# Patient Record
Sex: FEMALE | Race: WHITE | NOT HISPANIC OR LATINO | Employment: FULL TIME | URBAN - METROPOLITAN AREA
[De-identification: names, ages, dates, MRNs, and addresses within clinical notes are randomized per-mention and may not be internally consistent; named-entity substitution may affect disease eponyms.]

---

## 2023-11-11 PROBLEM — K80.10 CALCULUS OF GALLBLADDER WITH CHRONIC CHOLECYSTITIS WITHOUT OBSTRUCTION: Status: ACTIVE | Noted: 2023-11-11

## 2023-11-11 PROBLEM — J44.9 CHRONIC OBSTRUCTIVE LUNG DISEASE (MULTI): Status: ACTIVE | Noted: 2023-11-11

## 2023-11-11 PROBLEM — M35.00 SJOGREN'S SYNDROME (MULTI): Status: ACTIVE | Noted: 2023-11-11

## 2023-11-11 PROBLEM — M06.9 ARTHRITIS, RHEUMATOID (MULTI): Status: ACTIVE | Noted: 2023-11-11

## 2023-11-11 PROBLEM — K21.9 GASTROESOPHAGEAL REFLUX DISEASE: Status: ACTIVE | Noted: 2023-11-11

## 2023-11-11 PROBLEM — M18.12 PRIMARY OSTEOARTHRITIS OF FIRST CARPOMETACARPAL JOINT OF LEFT HAND: Status: ACTIVE | Noted: 2023-11-11

## 2023-11-11 PROBLEM — R07.9 CHEST PAIN: Status: ACTIVE | Noted: 2023-11-11

## 2023-11-11 PROBLEM — R06.00 DYSPNEA: Status: ACTIVE | Noted: 2023-11-11

## 2023-11-11 RX ORDER — HYDROCORTISONE 1 %
CREAM (GRAM) TOPICAL
COMMUNITY
Start: 2019-03-18

## 2023-11-11 RX ORDER — IPRATROPIUM BROMIDE 17 UG/1
2 AEROSOL, METERED RESPIRATORY (INHALATION) 4 TIMES DAILY
COMMUNITY

## 2023-11-11 RX ORDER — OMEPRAZOLE 20 MG/1
1 TABLET, DELAYED RELEASE ORAL DAILY
COMMUNITY

## 2023-11-11 RX ORDER — FLUTICASONE FUROATE, UMECLIDINIUM BROMIDE AND VILANTEROL TRIFENATATE 200; 62.5; 25 UG/1; UG/1; UG/1
POWDER RESPIRATORY (INHALATION)
COMMUNITY

## 2023-11-11 RX ORDER — CLOTRIMAZOLE AND BETAMETHASONE DIPROPIONATE 10; .64 MG/G; MG/G
1 CREAM TOPICAL DAILY
COMMUNITY

## 2023-11-11 RX ORDER — CELECOXIB 200 MG/1
1 CAPSULE ORAL DAILY
COMMUNITY
Start: 2021-09-01 | End: 2023-11-13 | Stop reason: SDUPTHER

## 2023-11-11 RX ORDER — ESTRADIOL 0.1 MG/G
CREAM VAGINAL DAILY
COMMUNITY

## 2023-11-11 RX ORDER — PANTOPRAZOLE SODIUM 40 MG/1
1 TABLET, DELAYED RELEASE ORAL DAILY PRN
COMMUNITY

## 2023-11-11 RX ORDER — TRAMADOL HYDROCHLORIDE AND ACETAMINOPHEN 37.5; 325 MG/1; MG/1
2 TABLET, FILM COATED ORAL EVERY 6 HOURS
COMMUNITY

## 2023-11-11 RX ORDER — FLUTICASONE FUROATE, UMECLIDINIUM BROMIDE AND VILANTEROL TRIFENATATE 100; 62.5; 25 UG/1; UG/1; UG/1
1 POWDER RESPIRATORY (INHALATION) DAILY
COMMUNITY

## 2023-11-11 RX ORDER — NYSTATIN 100000 [USP'U]/ML
5 SUSPENSION ORAL 4 TIMES DAILY
COMMUNITY
Start: 2021-02-20

## 2023-11-11 RX ORDER — DOCUSATE SODIUM 100 MG/1
1 CAPSULE, LIQUID FILLED ORAL 2 TIMES DAILY
COMMUNITY
Start: 2021-03-10

## 2023-11-11 RX ORDER — ALBUTEROL SULFATE 90 UG/1
2 AEROSOL, METERED RESPIRATORY (INHALATION) EVERY 4 HOURS PRN
COMMUNITY
Start: 2023-05-01

## 2023-11-11 RX ORDER — OMEPRAZOLE 40 MG/1
1 CAPSULE, DELAYED RELEASE ORAL 2 TIMES DAILY
COMMUNITY
Start: 2019-01-18

## 2023-11-11 RX ORDER — LOSARTAN POTASSIUM 50 MG/1
TABLET ORAL
COMMUNITY
Start: 2021-07-23

## 2023-11-11 RX ORDER — TIZANIDINE HYDROCHLORIDE 4 MG/1
1 CAPSULE, GELATIN COATED ORAL DAILY PRN
COMMUNITY
Start: 2019-01-18

## 2023-11-11 RX ORDER — VERAPAMIL HYDROCHLORIDE 120 MG/1
120 TABLET, FILM COATED ORAL 3 TIMES DAILY
COMMUNITY
Start: 2021-07-27

## 2023-11-11 RX ORDER — IBUPROFEN 800 MG/1
TABLET ORAL
COMMUNITY
Start: 2021-03-10

## 2023-11-11 RX ORDER — HYDROCODONE BITARTRATE AND ACETAMINOPHEN 5; 325 MG/1; MG/1
TABLET ORAL
COMMUNITY
Start: 2021-03-10

## 2023-11-11 RX ORDER — ROFLUMILAST 500 UG/1
500 TABLET ORAL DAILY
COMMUNITY
Start: 2023-05-01

## 2023-11-11 RX ORDER — VERAPAMIL HYDROCHLORIDE 180 MG/1
0.5 TABLET, FILM COATED, EXTENDED RELEASE ORAL DAILY
COMMUNITY

## 2023-11-11 RX ORDER — IPRATROPIUM BROMIDE AND ALBUTEROL SULFATE 2.5; .5 MG/3ML; MG/3ML
3 SOLUTION RESPIRATORY (INHALATION) EVERY 6 HOURS PRN
COMMUNITY
Start: 2023-05-24 | End: 2024-05-23

## 2023-11-13 ENCOUNTER — OFFICE VISIT (OUTPATIENT)
Dept: RHEUMATOLOGY | Facility: CLINIC | Age: 68
End: 2023-11-13
Payer: MEDICARE

## 2023-11-13 VITALS — BODY MASS INDEX: 24 KG/M2 | WEIGHT: 127 LBS

## 2023-11-13 DIAGNOSIS — M06.9 RHEUMATOID ARTHRITIS INVOLVING MULTIPLE SITES, UNSPECIFIED WHETHER RHEUMATOID FACTOR PRESENT (MULTI): Primary | ICD-10-CM

## 2023-11-13 DIAGNOSIS — M35.01 SJOGREN'S SYNDROME WITH KERATOCONJUNCTIVITIS SICCA (MULTI): ICD-10-CM

## 2023-11-13 DIAGNOSIS — M15.8 OTHER OSTEOARTHRITIS INVOLVING MULTIPLE JOINTS: ICD-10-CM

## 2023-11-13 PROCEDURE — 1036F TOBACCO NON-USER: CPT | Performed by: INTERNAL MEDICINE

## 2023-11-13 PROCEDURE — 99213 OFFICE O/P EST LOW 20 MIN: CPT | Performed by: INTERNAL MEDICINE

## 2023-11-13 PROCEDURE — 1159F MED LIST DOCD IN RCRD: CPT | Performed by: INTERNAL MEDICINE

## 2023-11-13 RX ORDER — CELECOXIB 200 MG/1
200 CAPSULE ORAL DAILY
Qty: 30 CAPSULE | Refills: 0 | Status: SHIPPED | OUTPATIENT
Start: 2023-11-13 | End: 2023-12-13

## 2023-11-13 NOTE — PROGRESS NOTES
"Subjective   Patient ID: Alia Nicole is a 67 y.o. female who presents for Arthritis and Follow-up.    HPI.  67-year-old female with history of RA, Sjogren syndrome, OA, chronic back pain, aortic stenosis, COPD and GERD presented for follow-up.    She reports intermittent upper back pain after cleaning, standing for prolonged period of time or walking.  She stated that she has had an pneumonia last month.  CT scan of the chest showed 2 new nodules.  She is on prednisone.  She is following with pulmonary medicine.    Dryness of the eyes and mouth is stable.       Past rheumatologic medication:  #1: Methotrexate: Could not tolerate it due to GI symptoms.  #2: Humira. Lost response.  #3: Actemra. Discontinue due to pneumonia.  #4: Hydroxychloroquine. Stroke several years ago.    Review of Systems   All other systems reviewed and are negative.    Objective .      9/1/2021     2:53 PM 11/23/2021     2:42 PM 3/22/2022     3:47 PM 7/26/2022     3:53 PM 1/24/2023     3:56 PM 7/24/2023     3:56 PM 11/13/2023     2:55 PM   Vitals   Systolic 124 140 120 115 132 132    Diastolic 76 80 80 70 77 70    Heart Rate    81      Height (in) 1.549 m (5' 1\")    1.549 m (5' 1\") 1.549 m (5' 1\")    Weight (lb)  142.13  135.38 134 126 127   BMI 27.21 kg/m2 26.85 kg/m2  25.58 kg/m2 25.32 kg/m2 23.81 kg/m2 24 kg/m2   BSA (m2) 1.68 m2 1.67 m2  1.63 m2 1.62 m2 1.57 m2 1.57 m2   Visit Report       Report      Physical Exam.  Gen. AAO x3, NAD.  HEENT: No pallor or icterus, PERRLA, EOMI. Parotid glands are not enlarged.  No cervical lymphadenopathy .  Skin: No rashes.  Heart: S1, S2/ RRR.   Lungs: CTA B.  Abdomen: Soft, NT/ND.  MSK: No.swelling or tenderness of the  upper or lower extremity joints with full ROM, neck with good range of motion.  Spine without tenderness.    Neuro:  Sensation to touch intact.Strength 5/5 throughout.   Psych:Appropriate mood and behavior  EXT: No edema    Assessment/Plan     67-year-old female with history of RA, " Sjogren syndrome, OA, chronic back pain, aortic stenosis, COPD and GERD presented for follow-up.    #1: RA/Sjogren syndrome.  Appears in remission.  -Continue pilocarpine as needed.  -Continue Biotene products.    #2: OA/chronic back pain.  -Discussed to take Tylenol 1 to 2 pills 2-3 times a day.  -Continue Celebrex as needed.    Follow-up in 6 months.     This note was partially generated using the Dragon Voice recognition system. There may be some incorrect wording, spelling and/or spelling errors or punctuation errors that were not corrected prior to committing the note to the medical record.    Patient Active Problem List   Diagnosis    Sjogren's syndrome (CMS/HCC)    Arthritis, rheumatoid (CMS/HCC)    Primary osteoarthritis of first carpometacarpal joint of left hand    Gastroesophageal reflux disease    Dyspnea    Chronic obstructive lung disease (CMS/HCC)    Chest pain    Calculus of gallbladder with chronic cholecystitis without obstruction      History reviewed. No pertinent surgical history.   Social History     Tobacco Use    Smoking status: Never    Smokeless tobacco: Never   Substance Use Topics    Alcohol use: Not on file      No family history on file.   Allergies   Allergen Reactions    Cortisone Unknown    Erythromycin Rash      Current Outpatient Medications   Medication Instructions    albuterol 90 mcg/actuation inhaler 2 puffs, inhalation, Every 4 hours PRN    celecoxib (CELEBREX) 200 mg, oral, Daily, With food    clotrimazole-betamethasone (Lotrisone) cream 1 Application, Topical, Daily, To affected area     docusate sodium (Colace) 100 mg capsule 1 capsule, oral, 2 times daily    estradiol (Estrace) 0.01 % (0.1 mg/gram) vaginal cream vaginal, Daily, As directed    ETODOLAC ORAL Etodolac    evolocumab (REPATHA SURECLICK SUBQ) subcutaneous,  subcutaneous every 2 minutes    fluticasone-umeclidin-vilanter (Trelegy Ellipta) 100-62.5-25 mcg blister with device 1 puff, inhalation, Daily     fluticasone-umeclidin-vilanter (Trelegy Ellipta) 200-62.5-25 mcg blister with device inhalation    HYDROcodone-acetaminophen (Norco) 5-325 mg tablet oral    hydrocortisone 1 % cream rectal,  USE AS DIRECTED.    ibuprofen 800 mg tablet oral    ipratropium (Atrovent HFA) 17 mcg/actuation inhaler 2 puffs, inhalation, 4 times daily    ipratropium-albuteroL (Duo-Neb) 0.5-2.5 mg/3 mL nebulizer solution 3 mL, inhalation, Every 6 hours PRN    losartan (Cozaar) 50 mg tablet oral    nystatin (Mycostatin) 100,000 unit/mL suspension 5 mL, oral, 4 times daily    omeprazole (PriLOSEC) 40 mg DR capsule 1 capsule, oral, 2 times daily    omeprazole OTC (PriLOSEC OTC) 20 mg EC tablet 1 tablet, oral, Daily    pantoprazole (ProtoNix) 40 mg EC tablet 1 tablet, oral, Daily PRN    roflumilast (DALIRESP) 500 mcg, oral, Daily    tiZANidine (Zanaflex) 4 mg capsule 1 capsule, oral, Daily PRN    traMADoL-acetaminophen (Ultracet) 37.5-325 mg tablet 2 tablets, oral, Every 6 hours    verapamil (CALAN) 120 mg, oral, 3 times daily    verapamil SR (Calan-SR) 180 mg ER tablet 0.5 tablets, oral, Daily

## 2023-11-13 NOTE — PATIENT INSTRUCTIONS
Take Tylenol and Celebrex as discussed.  Take pilocarpine as needed.  Continue Biotene products and eyedrops.  Call me if any question.  Follow-up in 6 months.

## 2023-12-11 PROCEDURE — 87624 HPV HI-RISK TYP POOLED RSLT: CPT

## 2023-12-11 PROCEDURE — 88175 CYTOPATH C/V AUTO FLUID REDO: CPT

## 2023-12-11 PROCEDURE — 88141 CYTOPATH C/V INTERPRET: CPT | Performed by: PATHOLOGY

## 2023-12-13 ENCOUNTER — LAB REQUISITION (OUTPATIENT)
Dept: LAB | Facility: HOSPITAL | Age: 68
End: 2023-12-13
Payer: MEDICARE

## 2023-12-13 DIAGNOSIS — Z11.51 ENCOUNTER FOR SCREENING FOR HUMAN PAPILLOMAVIRUS (HPV): ICD-10-CM

## 2023-12-13 DIAGNOSIS — Z01.419 ENCOUNTER FOR GYNECOLOGICAL EXAMINATION (GENERAL) (ROUTINE) WITHOUT ABNORMAL FINDINGS: ICD-10-CM

## 2023-12-13 DIAGNOSIS — R87.622 LOW GRADE SQUAMOUS INTRAEPITHELIAL LESION ON CYTOLOGIC SMEAR OF VAGINA (LGSIL): ICD-10-CM

## 2023-12-20 LAB
CYTOLOGY CMNT CVX/VAG CYTO-IMP: NORMAL
HPV HR 12 DNA GENITAL QL NAA+PROBE: POSITIVE
HPV HR GENOTYPES PNL CVX NAA+PROBE: POSITIVE
HPV16 DNA SPEC QL NAA+PROBE: NEGATIVE
HPV18 DNA SPEC QL NAA+PROBE: NEGATIVE
LAB AP HPV GENOTYPE QUESTION: YES
LAB AP HPV HR: NORMAL
LAB AP PREVIOUS ABNORMAL HISTORY: NORMAL
LABORATORY COMMENT REPORT: NORMAL
MENSTRUAL HX REPORTED: NORMAL
PATH REPORT.TOTAL CANCER: NORMAL

## 2025-02-03 ENCOUNTER — APPOINTMENT (OUTPATIENT)
Dept: RHEUMATOLOGY | Facility: CLINIC | Age: 70
End: 2025-02-03
Payer: MEDICARE